# Patient Record
Sex: MALE | Race: OTHER | URBAN - METROPOLITAN AREA
[De-identification: names, ages, dates, MRNs, and addresses within clinical notes are randomized per-mention and may not be internally consistent; named-entity substitution may affect disease eponyms.]

---

## 2019-05-30 ENCOUNTER — OFFICE VISIT (OUTPATIENT)
Dept: OTOLARYNGOLOGY | Facility: CLINIC | Age: 49
End: 2019-05-30

## 2019-05-30 VITALS
WEIGHT: 185 LBS | TEMPERATURE: 98 F | HEIGHT: 65 IN | BODY MASS INDEX: 30.82 KG/M2 | DIASTOLIC BLOOD PRESSURE: 66 MMHG | SYSTOLIC BLOOD PRESSURE: 100 MMHG

## 2019-05-30 DIAGNOSIS — R05.9 COUGH: Primary | ICD-10-CM

## 2019-05-30 PROCEDURE — 99203 OFFICE O/P NEW LOW 30 MIN: CPT | Performed by: OTOLARYNGOLOGY

## 2019-05-30 PROCEDURE — 99212 OFFICE O/P EST SF 10 MIN: CPT | Performed by: OTOLARYNGOLOGY

## 2019-05-30 RX ORDER — PRAVASTATIN SODIUM 10 MG
TABLET ORAL
Refills: 2 | COMMUNITY
Start: 2019-04-02

## 2019-05-30 RX ORDER — HYDROCHLOROTHIAZIDE 12.5 MG/1
CAPSULE, GELATIN COATED ORAL
Refills: 3 | COMMUNITY
Start: 2019-04-02

## 2019-05-30 RX ORDER — IBUPROFEN 600 MG/1
TABLET ORAL
Refills: 0 | COMMUNITY
Start: 2019-04-01

## 2019-05-30 RX ORDER — OMEPRAZOLE 40 MG/1
CAPSULE, DELAYED RELEASE ORAL
Refills: 1 | COMMUNITY
Start: 2019-04-25

## 2019-05-30 RX ORDER — NAPROXEN 500 MG/1
TABLET ORAL
Refills: 0 | COMMUNITY
Start: 2019-04-25

## 2019-05-30 RX ORDER — PSEUDOEPHEDRINE HCL 120 MG/1
120 TABLET, FILM COATED, EXTENDED RELEASE ORAL EVERY 12 HOURS
Qty: 60 TABLET | Refills: 3 | Status: SHIPPED | OUTPATIENT
Start: 2019-05-30

## 2019-05-30 RX ORDER — BROMPHENIRAMINE MALEATE, PSEUDOEPHEDRINE HYDROCHLORIDE, AND DEXTROMETHORPHAN HYDROBROMIDE 2; 30; 10 MG/5ML; MG/5ML; MG/5ML
SYRUP ORAL
Refills: 0 | COMMUNITY
Start: 2019-04-01

## 2019-05-30 RX ORDER — DESLORATADINE 5 MG/1
TABLET ORAL
Refills: 1 | COMMUNITY
Start: 2019-05-13

## 2019-05-30 NOTE — PROGRESS NOTES
Ezekiel Villarreal is a 57year old male.   Patient presents with:  Consult: cough for over 5 months ,productive in the AM white /brown mucus ,also mild sinus congestion in the AM       HISTORY OF PRESENT ILLNESS  He presents with a 5-month history of having an Inspection - Normal. Palpation - Normal. Parotid gland - Normal. Thyroid gland - Normal.   Eyes Normal Conjunctiva - Right: Normal, Left: Normal. Pupil - Right: Normal, Left: Normal. Fundus - Right: Normal, Left: Normal.   Neurological Normal Memory - Norm of tongue is normal in appearance. There is no airway obstruction. General comments: Severe erythema of the laryngeal mucosa including the vocal cords. No evidence of.                 Current Outpatient Medications:   •  Pseudoephedrine HCl  MG Or

## 2019-07-11 ENCOUNTER — OFFICE VISIT (OUTPATIENT)
Dept: OTOLARYNGOLOGY | Facility: CLINIC | Age: 49
End: 2019-07-11

## 2019-07-11 VITALS
WEIGHT: 185 LBS | BODY MASS INDEX: 30.82 KG/M2 | HEIGHT: 65 IN | DIASTOLIC BLOOD PRESSURE: 89 MMHG | SYSTOLIC BLOOD PRESSURE: 126 MMHG | TEMPERATURE: 97 F

## 2019-07-11 DIAGNOSIS — R05.9 COUGH: Primary | ICD-10-CM

## 2019-07-11 PROCEDURE — 99214 OFFICE O/P EST MOD 30 MIN: CPT | Performed by: OTOLARYNGOLOGY

## 2019-07-11 RX ORDER — MONTELUKAST SODIUM 10 MG/1
10 TABLET ORAL NIGHTLY
Qty: 30 TABLET | Refills: 3 | Status: SHIPPED | OUTPATIENT
Start: 2019-07-11

## 2019-07-12 NOTE — PROGRESS NOTES
Alistair Min is a 57year old male.   Patient presents with:  Cough: patient stated taking medication prescribed but still felt the same still coughing worst in the morning with phlegm white of brown color,patient is here for follow up      HISTORY OF PRE bleeding and easy bruising.            PHYSICAL EXAM    /89 (BP Location: Right arm)   Temp 96.5 °F (35.8 °C) (Tympanic)   Ht 5' 5\" (1.651 m)   Wt 185 lb (83.9 kg)   BMI 30.79 kg/m²        Constitutional Normal Overall appearance - Normal.   Psychiat examination of the interior of the larynx was performed. Findings were as follows.        Hypopharynx/Larynx:  Epiglottis is normal.  Arytenoids:  Bilateral: Arytenoids are normal.  Vocal folds-false  Bilateral: Vocal folds (false) are normal.  Vocal fold